# Patient Record
Sex: FEMALE | Race: WHITE
[De-identification: names, ages, dates, MRNs, and addresses within clinical notes are randomized per-mention and may not be internally consistent; named-entity substitution may affect disease eponyms.]

---

## 2018-08-30 ENCOUNTER — HOSPITAL ENCOUNTER (OUTPATIENT)
Dept: HOSPITAL 61 - KCIC MRI | Age: 53
Discharge: HOME | End: 2018-08-30
Attending: OBSTETRICS & GYNECOLOGY
Payer: COMMERCIAL

## 2018-08-30 DIAGNOSIS — N85.2: Primary | ICD-10-CM

## 2018-08-30 PROCEDURE — A9585 GADOBUTROL INJECTION: HCPCS

## 2018-08-30 PROCEDURE — 72197 MRI PELVIS W/O & W/DYE: CPT

## 2018-08-30 NOTE — [facility]
MRI pelvis with and without contrast dated 8/30/2018.

 

No comparison available.

 

Clinical data indication: Left lower quadrant pain. Fibroid uterus.

 

TECHNIQUE:

 

Routine multiplanar multisequence MR imaging performed with and without 

the administration of 10 cc Gadavist.

 

FINDINGS:

 

Uterus is enlarged, measuring 15.5 x 7.6 x 9.7 cm. There are multiple 

heterogeneous mass lesions throughout the myometrium, consistent with 

fibroids. Lesion at the right anterior aspect of the lower uterine segment

is predominantly hypointense to isointense on T1 and T2-weighted imaging 

and measures up to 4.7 cm. More heterogeneous lesion at the left aspect of

the lower uterine body measures up to 5.6 cm in size and contains some 

internal cystic components. Heterogeneous lesion at the posterior uterine 

body near the fundus is partially exophytic and measures 4.6 cm maximum 

dimension. Smaller nodules located at the fundus and anterior uterine body

measure up to 2.8 cm maximum dimension. There are several additional 

cystic foci throughout.

 

The endometrium is thickened and heterogeneous with prominent enhancement 

measuring up to 2.7 cm in width. There is a small nodular focus within the

endometrium at the lower uterine segment which may be a submucosal fibroid

or a pedunculated polyp, measuring about 7 mm maximum dimension. Multiple 

nabothian cysts at the endocervix.

 

Left ovary measures 5.1 x 5.2 x 6.6 cm. There are multiple cysts at the 

left ovary, some of which are complex. There is a central solid component 

measuring up to 2.2 cm. A simple cyst at the upper margin of the left 

ovary measures up to 4.3 cm in size.

 

The right ovary is not clearly identified. There is a cluster small cyst 

along the anterior right margin of the uterus that could represent a cyst 

filled ovary, measuring approximately 4.2 x 1.6 x 2.0 cm. No definite 

solid component or abnormal enhancement.

 

There is no significant free fluid. No pelvic adenopathy. Urinary bladder 

is collapsed and not well evaluated. No acute bony abnormality.

 

IMPRESSION:

1. Enlarged myomatous uterus as described above.

2. Thickened enhancing endometrial complex of uncertain etiology. This 

could be related to menstrual cycle or endometrial hyperplasia. If the 

patient is postmenopausal, malignancy cannot be excluded.

3. There is an additional nonenhancing nodular focus within the canal at 

the lower uterine segment which could represent a small submucosal fibroid

or polyp.

4. Complex cystic mass on the left with possible 2.2 cm central solid 

nodular component. Although this could represent hemorrhagic cyst or other

complex benign cyst, underlying ovarian malignancy cannot be excluded. 

Follow-up imaging is recommended to ensure stability.

 

Electronically signed by: Rick Watters MD (8/30/2018 1:24 PM) 

[facility]

## 2018-10-18 ENCOUNTER — HOSPITAL ENCOUNTER (OUTPATIENT)
Dept: HOSPITAL 61 - SURGPAT | Age: 53
Discharge: HOME | End: 2018-10-18
Attending: OBSTETRICS & GYNECOLOGY
Payer: COMMERCIAL

## 2018-10-18 DIAGNOSIS — Z01.818: Primary | ICD-10-CM

## 2018-10-18 DIAGNOSIS — E66.01: ICD-10-CM

## 2018-10-18 DIAGNOSIS — N85.2: ICD-10-CM

## 2018-10-18 LAB
ALBUMIN SERPL-MCNC: 3.3 G/DL (ref 3.4–5)
ALBUMIN/GLOB SERPL: 0.7 {RATIO} (ref 1–1.7)
ALP SERPL-CCNC: 104 U/L (ref 46–116)
ALT SERPL-CCNC: 83 U/L (ref 14–59)
ANION GAP SERPL CALC-SCNC: 5 MMOL/L (ref 6–14)
APTT PPP: YELLOW S
AST SERPL-CCNC: 63 U/L (ref 15–37)
BACTERIA #/AREA URNS HPF: 0 /HPF
BASOPHILS # BLD AUTO: 0.1 X10^3/UL (ref 0–0.2)
BASOPHILS NFR BLD: 1 % (ref 0–3)
BILIRUB SERPL-MCNC: 0.5 MG/DL (ref 0.2–1)
BILIRUB UR QL STRIP: NEGATIVE
BUN SERPL-MCNC: 12 MG/DL (ref 7–20)
BUN/CREAT SERPL: 17 (ref 6–20)
CALCIUM SERPL-MCNC: 8.5 MG/DL (ref 8.5–10.1)
CHLORIDE SERPL-SCNC: 103 MMOL/L (ref 98–107)
CO2 SERPL-SCNC: 29 MMOL/L (ref 21–32)
CREAT SERPL-MCNC: 0.7 MG/DL (ref 0.6–1)
EOSINOPHIL NFR BLD: 0.3 X10^3/UL (ref 0–0.7)
EOSINOPHIL NFR BLD: 3 % (ref 0–3)
ERYTHROCYTE [DISTWIDTH] IN BLOOD BY AUTOMATED COUNT: 15.5 % (ref 11.5–14.5)
FIBRINOGEN PPP-MCNC: CLEAR MG/DL
GFR SERPLBLD BASED ON 1.73 SQ M-ARVRAT: 87.9 ML/MIN
GLOBULIN SER-MCNC: 4.7 G/DL (ref 2.2–3.8)
GLUCOSE SERPL-MCNC: 101 MG/DL (ref 70–99)
HCT VFR BLD CALC: 37.9 % (ref 36–47)
HGB BLD-MCNC: 12.9 G/DL (ref 12–15.5)
LYMPHOCYTES # BLD: 1.7 X10^3/UL (ref 1–4.8)
LYMPHOCYTES NFR BLD AUTO: 19 % (ref 24–48)
MCH RBC QN AUTO: 29 PG (ref 25–35)
MCHC RBC AUTO-ENTMCNC: 34 G/DL (ref 31–37)
MCV RBC AUTO: 85 FL (ref 79–100)
MONO #: 0.6 X10^3/UL (ref 0–1.1)
MONOCYTES NFR BLD: 6 % (ref 0–9)
NEUT #: 6.4 X10^3UL (ref 1.8–7.7)
NEUTROPHILS NFR BLD AUTO: 71 % (ref 31–73)
NITRITE UR QL STRIP: NEGATIVE
PH UR STRIP: 5.5 [PH]
PLATELET # BLD AUTO: 274 X10^3/UL (ref 140–400)
POTASSIUM SERPL-SCNC: 3.5 MMOL/L (ref 3.5–5.1)
PROT SERPL-MCNC: 8 G/DL (ref 6.4–8.2)
PROT UR STRIP-MCNC: NEGATIVE MG/DL
RBC # BLD AUTO: 4.49 X10^6/UL (ref 3.5–5.4)
RBC #/AREA URNS HPF: >40 /HPF (ref 0–2)
SODIUM SERPL-SCNC: 137 MMOL/L (ref 136–145)
SQUAMOUS #/AREA URNS LPF: (no result) /LPF
UROBILINOGEN UR-MCNC: 0.2 MG/DL
WBC # BLD AUTO: 9 X10^3/UL (ref 4–11)
WBC #/AREA URNS HPF: (no result) /HPF (ref 0–4)

## 2018-10-18 PROCEDURE — 93005 ELECTROCARDIOGRAM TRACING: CPT

## 2018-10-18 PROCEDURE — 80053 COMPREHEN METABOLIC PANEL: CPT

## 2018-10-18 PROCEDURE — 36415 COLL VENOUS BLD VENIPUNCTURE: CPT

## 2018-10-18 PROCEDURE — 81001 URINALYSIS AUTO W/SCOPE: CPT

## 2018-10-18 PROCEDURE — 71046 X-RAY EXAM CHEST 2 VIEWS: CPT

## 2018-10-18 PROCEDURE — 85025 COMPLETE CBC W/AUTO DIFF WBC: CPT

## 2018-10-18 NOTE — RAD
EXAM: Chest, 2 views.

 

HISTORY: Preoperative evaluation. Pain.

 

COMPARISON: None.

 

FINDINGS: 2 views of the chest are obtained. There is no infiltrate, 

pleural effusion or pneumothorax. The heart is normal in size.

 

IMPRESSION: No acute pulmonary finding.

 

Electronically signed by: Viviane Singleton MD (10/18/2018 3:46 PM) [facility]

## 2018-10-18 NOTE — EKG
[facility]

              [address] [place] [address]

Test Date:    2018-10-18               Test Time:    14:26:08

Pat Name:     LISA TEJADA             Department:   

Patient ID:   [id number]           Room:          

Gender:       F                        Technician:   JOCE

:          1965               Requested By: SEBASTIÁN MUNOZ

Order Number: [id number]           Reading MD:   Dereck Noble MD

                                 Measurements

Intervals                              Axis          

Rate:         75                       P:            53

VT:           166                      QRS:          15

QRSD:         92                       T:            70

QT:           384                                    

QTc:          431                                    

                           Interpretive Statements

SINUS RHYTHM



Electronically Signed On 10- 13:43:31 CDT by Dereck Noble MD

## 2018-10-31 ENCOUNTER — HOSPITAL ENCOUNTER (INPATIENT)
Dept: HOSPITAL 61 - SURG | Age: 53
LOS: 2 days | Discharge: HOME | DRG: 743 | End: 2018-11-02
Attending: OBSTETRICS & GYNECOLOGY | Admitting: OBSTETRICS & GYNECOLOGY
Payer: COMMERCIAL

## 2018-10-31 VITALS — SYSTOLIC BLOOD PRESSURE: 91 MMHG | DIASTOLIC BLOOD PRESSURE: 55 MMHG

## 2018-10-31 VITALS — BODY MASS INDEX: 41.64 KG/M2 | HEIGHT: 63 IN | WEIGHT: 235 LBS

## 2018-10-31 VITALS — SYSTOLIC BLOOD PRESSURE: 126 MMHG | DIASTOLIC BLOOD PRESSURE: 67 MMHG

## 2018-10-31 VITALS — DIASTOLIC BLOOD PRESSURE: 59 MMHG | SYSTOLIC BLOOD PRESSURE: 97 MMHG

## 2018-10-31 VITALS — SYSTOLIC BLOOD PRESSURE: 97 MMHG | DIASTOLIC BLOOD PRESSURE: 55 MMHG

## 2018-10-31 VITALS — SYSTOLIC BLOOD PRESSURE: 96 MMHG | DIASTOLIC BLOOD PRESSURE: 51 MMHG

## 2018-10-31 VITALS — SYSTOLIC BLOOD PRESSURE: 99 MMHG | DIASTOLIC BLOOD PRESSURE: 52 MMHG

## 2018-10-31 DIAGNOSIS — N83.202: ICD-10-CM

## 2018-10-31 DIAGNOSIS — Z88.2: ICD-10-CM

## 2018-10-31 DIAGNOSIS — D25.9: Primary | ICD-10-CM

## 2018-10-31 DIAGNOSIS — R97.1: ICD-10-CM

## 2018-10-31 LAB
ERYTHROCYTE [DISTWIDTH] IN BLOOD BY AUTOMATED COUNT: 15.2 % (ref 11.5–14.5)
HCT VFR BLD CALC: 25.6 % (ref 36–47)
HGB BLD-MCNC: 8.8 G/DL (ref 12–15.5)
MCH RBC QN AUTO: 29 PG (ref 25–35)
MCHC RBC AUTO-ENTMCNC: 34 G/DL (ref 31–37)
MCV RBC AUTO: 85 FL (ref 79–100)
PLATELET # BLD AUTO: 321 X10^3/UL (ref 140–400)
RBC # BLD AUTO: 3.02 X10^6/UL (ref 3.5–5.4)
U PREG PATIENT: NEGATIVE
WBC # BLD AUTO: 15.9 X10^3/UL (ref 4–11)

## 2018-10-31 PROCEDURE — 0UT60ZZ RESECTION OF LEFT FALLOPIAN TUBE, OPEN APPROACH: ICD-10-PCS | Performed by: OBSTETRICS & GYNECOLOGY

## 2018-10-31 PROCEDURE — 85027 COMPLETE CBC AUTOMATED: CPT

## 2018-10-31 PROCEDURE — 81025 URINE PREGNANCY TEST: CPT

## 2018-10-31 PROCEDURE — 88112 CYTOPATH CELL ENHANCE TECH: CPT

## 2018-10-31 PROCEDURE — 86901 BLOOD TYPING SEROLOGIC RH(D): CPT

## 2018-10-31 PROCEDURE — 36415 COLL VENOUS BLD VENIPUNCTURE: CPT

## 2018-10-31 PROCEDURE — 3E1M38X IRRIGATION OF PERITONEAL CAVITY USING IRRIGATING SUBSTANCE, PERCUTANEOUS APPROACH, DIAGNOSTIC: ICD-10-PCS | Performed by: OBSTETRICS & GYNECOLOGY

## 2018-10-31 PROCEDURE — 86850 RBC ANTIBODY SCREEN: CPT

## 2018-10-31 PROCEDURE — 80048 BASIC METABOLIC PNL TOTAL CA: CPT

## 2018-10-31 PROCEDURE — 88305 TISSUE EXAM BY PATHOLOGIST: CPT

## 2018-10-31 PROCEDURE — P9045 ALBUMIN (HUMAN), 5%, 250 ML: HCPCS

## 2018-10-31 PROCEDURE — 0UT90ZZ RESECTION OF UTERUS, OPEN APPROACH: ICD-10-PCS | Performed by: OBSTETRICS & GYNECOLOGY

## 2018-10-31 PROCEDURE — 0UT10ZZ RESECTION OF LEFT OVARY, OPEN APPROACH: ICD-10-PCS | Performed by: OBSTETRICS & GYNECOLOGY

## 2018-10-31 PROCEDURE — A4461 SURGICL DRESS HOLD NON-REUSE: HCPCS

## 2018-10-31 PROCEDURE — A7015 AEROSOL MASK USED W NEBULIZE: HCPCS

## 2018-10-31 PROCEDURE — 85014 HEMATOCRIT: CPT

## 2018-10-31 PROCEDURE — 86900 BLOOD TYPING SEROLOGIC ABO: CPT

## 2018-10-31 PROCEDURE — 88307 TISSUE EXAM BY PATHOLOGIST: CPT

## 2018-10-31 RX ADMIN — FENTANYL CITRATE PRN MCG: 50 INJECTION INTRAMUSCULAR; INTRAVENOUS at 12:06

## 2018-10-31 RX ADMIN — SODIUM CHLORIDE, SODIUM LACTATE, POTASSIUM CHLORIDE, AND CALCIUM CHLORIDE SCH MLS/HR: .6; .31; .03; .02 INJECTION, SOLUTION INTRAVENOUS at 06:42

## 2018-10-31 RX ADMIN — SODIUM CHLORIDE, SODIUM LACTATE, POTASSIUM CHLORIDE, AND CALCIUM CHLORIDE SCH MLS/HR: .6; .31; .03; .02 INJECTION, SOLUTION INTRAVENOUS at 11:22

## 2018-10-31 RX ADMIN — FENTANYL CITRATE PRN MCG: 50 INJECTION INTRAMUSCULAR; INTRAVENOUS at 11:21

## 2018-10-31 RX ADMIN — SODIUM CHLORIDE, SODIUM LACTATE, POTASSIUM CHLORIDE, AND CALCIUM CHLORIDE SCH MLS/HR: .6; .31; .03; .02 INJECTION, SOLUTION INTRAVENOUS at 12:05

## 2018-10-31 NOTE — OP
DATE OF SURGERY:  10/31/2018



PREOPERATIVE DIAGNOSES:  Enlarged fibroid uterus, left ovarian mass with

elevated CA-125.



PROCEDURE:  Total abdominal hysterectomy, left salpingo-oophorectomy and pelvic

washings for cytology with a prior RSO.



SURGEON:  Sebastián Munoz MD.



ASSISTANT:  first linden Dougherty.



ANESTHESIOLOGIST:  Soy Bee MD.



ANESTHESIA:  General endotracheal.



ESTIMATED BLOOD LOSS:  1100 mL.



URINE OUTPUT:  200 mL, clear via the whole case until the last 15-20 minutes

where she got 30 mL of blood-tinged urine via the Calvert.



INTRAVENOUS FLUIDS:  1300 mL of LR and then 500 mL of albumin.



SPECIMEN REMOVED:  Cervix, uterus fibroid, tumors left tube and ovary.



FINDINGS:  Enlarged fibroid uterus.  No significant pelvic adhesive disease,

left ovarian cystic mass that was sent off for fresh pathology, but Dr. Sanchez came back and said appeared to be a benign process.



COMPLICATIONS:  None.



DESCRIPTION OF PROCEDURE:  This patient was taken to the operating room where

general anesthesia was placed.  The patient was placed in a dorsal supine

position.  A Calvert catheter had been inserted under sterile technique.  The

patient's abdomen was prepped and draped in the normal sterile fashion.  After a

timeout was performed, she had been given her IV antibiotics and the time

everyone approved the timeout, a transverse Pfannenstiel skin incision was made

with the scalpel.  Bovie cautery was used in the subcuticular layer to maintain

hemostasis.  The fascia was scored in the midline and extended sharply and

bluntly bilaterally with the Bovie cautery.  Kocher clamps x 2 were placed on

superior fascial edge.  The fascia was dissected from the rectus muscles beneath

sharply and bluntly.  This was done inferiorly as well.  The rectus muscles were

 digitally in the midline and the peritoneum was digitally and bluntly

entered and stretched.  The enlarged uterus and cystic left ovary could easily

be felt.  She was placed in a slight Trendelenburg, 3 moist laps were used to

pack away the bowel and an O'Austyn-O'Puga retractor was placed in the

abdomen with a large shallow upper blade and a bladder blade below.  Pean's x 2

were placed on the cornua of the uterus to elevate it.  It was very difficult as

this was a broad uterus and barely came through the thing.  The right side had

previously been taken, but I did get the round ligament on the right side. 

There was no tube and ovary on the right, but the #0 Vicryl was used to place 2

stitches in the right round ligament.  The Bovie cautery was used to cauterize

between these and go down towards the bladder flap anteriorly and then, this was

done exactly the same on the left getting the round ligament with 0 Vicryl x 2,

but using the Bovie cautery to go through this and go down towards the bladder

flap anteriorly.  At this point, there was a large fibroid anteriorly that was

scored with the cautery.  A towel clip was placed over it and it was taken off,

so I could see anteriorly and further take down that bladder flap anteriorly.  I

felt low and felt the ureter coursing low on the left side.  Again, the right

side, I did not even do anything on the right side as it was previously taken. 

So I did not even feel for the ureter on this side or do anything on the right

side as it was completely gone, but it felt to be low and the cystic mass with

high right next to the uterus, so a window through this area after the round

ligament was obtained.  My finger popped through.  Curved Cathi clamps x 2 were

placed behind the cystic mass, the curved Heaneys x 2 and it was cut off.  It

completely came off and it was passed off for fresh pathology.  An 0 Vicryl was

used to tie these.  The first one was just at the tip and around and then the

second one was like a Brian tree before and after going through the middle

wrapping it around and making sure the blood supply was adequately got.  Once

this was done, the remainder of the utero-ovarian pedicle was doubly clamped

freeing everything up with curved Heaneys, cut and this freed up the left side

and then, curved Heaneys were placed on the uterine vessels on this side x 2.  A

straight Allis was placed for backbleeding on the other side of it and it was

cut with Elkins scissors.  An 0 Vicryl x 2 were placed on the uterine vascular

pedicles.  This was done exactly the same on the right side, double clamping

with curved Cathi's, cutting with Elkins scissors, putting a straight Allis on

for backbleeding, cutting and then suture ligating x 2 with 0 Vicryl.  Then

staying inside that pedicle, straight Heaneys were used on both sides going down

through the cardinal and broad ligaments.  There were more fibroids that were

taken out and at one point, now that we were down to the cervix and I could see

cervix, I did take the cautery and amputate the uterus and place our long

Kocher's on the cervical stub.  Once this was done, I was able to take a sponge

stick and make sure the bladder was down anteriorly and it was and even at this

point, the urine was clear, everything looked good, the bladder was down.  So 2

more bites with the straights and then a curved got us in vaginally and the

Otf's were used to amputate the remaining cervix.  The long Kocher's were

placed on the anterior and posterior vaginal cuff and a series of interrupted 0

Vicryls were used to close the cuff.  I started at the corners on both coming in

and taking it through the pedicles where the uterosacrals were on both sides

tagging the corners and then placing I believe 4 interrupted 0 Vicryl sutures on

the cuff as well.  Once this was done, copious irrigation revealed hemostasis. 

The round and left pedicles were dry.  Nuzhat was placed over the vaginal cuff

and the tags were clipped.  Nothing weld up, everything stayed dry.  All 3 moist

laps were removed from the abdomen where the packing had been done.  All sponge,

lap and needle counts were correct x 2 by OR personnel.  The rectus muscles were

dry, so at this point, I did look again and the cuff remained dry and the Nuzhat

remained white and powdery.  So, the fascia was closed with 0 Vicryl from left

to past midline and then right embedded and tied together.  Copious irrigation

in the subcuticular layer revealed hemostasis as well.  A 3-0 Vicryl was used to

close off the Giulia's subcuticular layer and 3-0 Monocryl was used to close the

skin.  Mastisol and Steri-Strips were placed over the skin as well.  The patient

tolerated the procedure well and is currently being awakened from anesthesia.

 



______________________________

SEBASTIÁN MUNOZ MD



DR:  SEBASTIAN/chuyita  JOB#:  [id number] / [id number]

DD:  10/31/2018 10:59  DT:  10/31/2018 12:27

## 2018-11-01 VITALS — DIASTOLIC BLOOD PRESSURE: 67 MMHG | SYSTOLIC BLOOD PRESSURE: 125 MMHG

## 2018-11-01 VITALS — DIASTOLIC BLOOD PRESSURE: 70 MMHG | SYSTOLIC BLOOD PRESSURE: 128 MMHG

## 2018-11-01 VITALS — SYSTOLIC BLOOD PRESSURE: 119 MMHG | DIASTOLIC BLOOD PRESSURE: 59 MMHG

## 2018-11-01 VITALS — DIASTOLIC BLOOD PRESSURE: 69 MMHG | SYSTOLIC BLOOD PRESSURE: 131 MMHG

## 2018-11-01 LAB
ANION GAP SERPL CALC-SCNC: 6 MMOL/L (ref 6–14)
BUN SERPL-MCNC: 8 MG/DL (ref 7–20)
CALCIUM SERPL-MCNC: 7.9 MG/DL (ref 8.5–10.1)
CHLORIDE SERPL-SCNC: 109 MMOL/L (ref 98–107)
CO2 SERPL-SCNC: 28 MMOL/L (ref 21–32)
CREAT SERPL-MCNC: 0.7 MG/DL (ref 0.6–1)
GFR SERPLBLD BASED ON 1.73 SQ M-ARVRAT: 87.9 ML/MIN
GLUCOSE SERPL-MCNC: 124 MG/DL (ref 70–99)
POTASSIUM SERPL-SCNC: 4.4 MMOL/L (ref 3.5–5.1)
SODIUM SERPL-SCNC: 143 MMOL/L (ref 136–145)

## 2018-11-01 RX ADMIN — HYDROCODONE BITARTRATE AND ACETAMINOPHEN PRN TAB: 5; 325 TABLET ORAL at 05:26

## 2018-11-01 RX ADMIN — OXYCODONE HYDROCHLORIDE AND ACETAMINOPHEN PRN TAB: 5; 325 TABLET ORAL at 09:53

## 2018-11-01 RX ADMIN — HYDROCODONE BITARTRATE AND ACETAMINOPHEN PRN TAB: 5; 325 TABLET ORAL at 00:17

## 2018-11-01 RX ADMIN — Medication SCH MG: at 16:28

## 2018-11-01 RX ADMIN — OXYCODONE HYDROCHLORIDE AND ACETAMINOPHEN PRN TAB: 5; 325 TABLET ORAL at 16:29

## 2018-11-01 RX ADMIN — Medication SCH MG: at 12:28

## 2018-11-01 NOTE — PATHOLOGY
Note

[facility] Accession Number: [id number]

   TESTS               RESULT  FLAG  UNITS    REF RANGE  LAB

------------------------------------------------------------

   Clinician Provided Cytology Information

   No. of containers..01 Other (Miscellaneous)

Source:                                                   01

   PELVIC WASH

DIAGNOSIS:                                                02

   PELVIC WASH

   NEGATIVE FOR MALIGNANT CELLS.

   SCANT CELLULARITY.

   THIS INTERPRETATION INCLUDES EVALUATION OF A CELL BLOCK.

Signed out by:                                            02

   Bunny Gee MD, Pathologist

   NPI- [id number]

Performed by:                                             01

   Kilo Chowdhury, Cytotechnologist ([facility])

Gross description:                                        01

   18ML, RED, SOLID

   /LCS



------------------------------------------------------------

    FLAG LEGEND:

    L-Low Normal,H-High Normal,LL-Alert Low,HH-Alert High

    <-Panic Low,>-Panic High,A-Abnormal,AA-Critical Abnormal

------------------------------------------------------------



Performed at:

[address]

   [place]  [address]

   Jony Lennon MD, Phone: [phone]

02 [facility]

   [address] [place]  [address]

   Chon Sanchez MD, Phone: [phone]

Specimen Comment: A courtesy copy of this report has been sent to

Specimen Comment: [phone].

Specimen Comment: Report sent to 

Specimen Comment: A duplicate report has been generated due to demographic updates.

***Performed at:  [address] [place] [address] [address], [place], [place]  [id number]

   MD Jony Lennon MD Phone:  [phone]

## 2018-11-02 VITALS
SYSTOLIC BLOOD PRESSURE: 118 MMHG | SYSTOLIC BLOOD PRESSURE: 118 MMHG | DIASTOLIC BLOOD PRESSURE: 70 MMHG | DIASTOLIC BLOOD PRESSURE: 70 MMHG | DIASTOLIC BLOOD PRESSURE: 70 MMHG | DIASTOLIC BLOOD PRESSURE: 70 MMHG | SYSTOLIC BLOOD PRESSURE: 118 MMHG | SYSTOLIC BLOOD PRESSURE: 118 MMHG

## 2018-11-02 VITALS — DIASTOLIC BLOOD PRESSURE: 61 MMHG | SYSTOLIC BLOOD PRESSURE: 110 MMHG

## 2018-11-02 RX ADMIN — Medication SCH MG: at 12:32

## 2018-11-02 RX ADMIN — OXYCODONE HYDROCHLORIDE AND ACETAMINOPHEN PRN TAB: 5; 325 TABLET ORAL at 00:07

## 2018-11-02 RX ADMIN — HYDROCODONE BITARTRATE AND ACETAMINOPHEN PRN TAB: 5; 325 TABLET ORAL at 12:32

## 2018-11-02 RX ADMIN — HYDROCODONE BITARTRATE AND ACETAMINOPHEN PRN TAB: 5; 325 TABLET ORAL at 08:51

## 2018-11-02 NOTE — PDOC3
Discharge Summary


Visit Information


Date of Admission:  Oct 31, 2018


Date of Discharge:  Nov 2, 2018


Final Diagnosis


enlarged fibroid uterus, left ovarian mass





Brief Hospital Course


Allergies





 Allergies








Coded Allergies Type Severity Reaction Last Updated Verified


 


  Sulfa (Sulfonamide Antibiotics) Allergy Intermediate  10/31/18 Yes








Vital Signs





Vital Signs








  Date Time  Temp Pulse Resp B/P (MAP) Pulse Ox O2 Delivery O2 Flow Rate FiO2


 


11/2/18 03:50 98.4 75 15 110/61 (77) 97 Room Air  





 98.4       








Lab Results





Laboratory Tests








Test


 10/31/18


11:25 11/1/18


05:10


 


White Blood Count


 15.9 x10^3/uL


(4.0-11.0) 





 


Red Blood Count


 3.02 x10^6/uL


(3.50-5.40) 





 


Hemoglobin


 8.8 g/dL


(12.0-15.5) 





 


Hematocrit


 25.6 %


(36.0-47.0) 23.3 %


(36.0-47.0)


 


Mean Corpuscular Volume 85 fL ()  


 


Mean Corpuscular Hemoglobin 29 pg (25-35)  


 


Mean Corpuscular Hemoglobin


Concent 34 g/dL


(31-37) 





 


Red Cell Distribution Width


 15.2 %


(11.5-14.5) 





 


Platelet Count


 321 x10^3/uL


(140-400) 





 


Sodium Level


 


 143 mmol/L


(136-145)


 


Potassium Level


 


 4.4 mmol/L


(3.5-5.1)


 


Chloride Level


 


 109 mmol/L


()


 


Carbon Dioxide Level


 


 28 mmol/L


(21-32)


 


Anion Gap  6 (6-14) 


 


Blood Urea Nitrogen  8 mg/dL (7-20) 


 


Creatinine


 


 0.7 mg/dL


(0.6-1.0)


 


Estimated GFR


(Cockcroft-Gault) 


 87.9 





 


Glucose Level


 


 124 mg/dL


(70-99)


 


Calcium Level


 


 7.9 mg/dL


(8.5-10.1)








Brief Hospital Course


Ms. Santos  is a 53 old female who presented with enlarged fibroid uterus with 

menorrhagia, during the work up an enlarged left ovary with mass was discovered 

also with elevated ca-125.  She underwent a BRENNEN/LSO/pelvic washings (prior RSO) 

on 10-31-18.  Initial fresh of the left ovary appeared to be a benign process.  

She has had an unremarkable posteropative course.  She is doing well tolerating 

regular diet, voiding, ambulating and desiring to go home.  Pain controlled 

with Lortabs here fine.





Discharge Information


Condition at Discharge:  Improved


Follow Up:  Weeks


Disposition/Orders:  D/C to Home


Scheduled


Fexofenadine Hcl (Allegra Allergy) 180 Mg Tablet, 180 MG PO DAILY, (Reported)


   Entered as Reported by: Kadi Robert on 8/30/18 1243


   Last Taken: Unknown Dose on 10/29/18      Last Action: Last Taken Edited on 

10/31/18 0642 by YOVANNY STONE


Paroxetine Hcl (Paxil) 20 Mg Tablet, 1 TAB PO HS, #30 Ref 5 (Reported)


   Entered as Reported by: EH GUADARRAMA on 10/18/18 1445


   Last Taken: Unknown Dose on 10/29/18      Last Action: Last Taken Edited on 

10/31/18 0642 by YOVANNY STONE





Scheduled PRN


Ibuprofen (Ibuprofen) 400 Mg Tablet, 400 MG PO PRN Q6HRS PRN for INFLAMMATION, (

Reported)


   Entered as Reported by: Kadi Robert on 8/30/18 1243


   Last Taken: Unknown Dose on 10/23/18      Last Action: Last Taken Edited on 

10/31/18 0642 by YOVANNY STONE





Patient Instructions


Patient Instructions


POD#2 s/p BRENNEN/LSO/pelvic washings


Routine pO care


d/c to home


NPV x 6 weeks


light/limited activity x 2 weeks


NO driving on pain meds


keep scheduled follow up with me in 7-10d 


call or return sooner for any other questions or concerns not limited to but 

including pain unrelieved with pain meds, increased or unexplained vaginal 

bleeding or T>100.4


already has pain meds at home


BID iron with meals











SEBASTIÁN MUNOZ MD Nov 2, 2018 08:27

## 2018-11-04 NOTE — PATHOLOGY
[facility] Accession Number: [id number]

.                                                                01

Material submitted:                                        .

PART A: LEFT FALLOPIAN TUBE AND OVARY FRESH - FS

PART B: UTERUS, CERVIX AND FIBROIDS

.                                                                01

Clinical history:                                          .

Vaginal bleeding

.                                                                02

**********************************************************************

Diagnosis:

A. Ovary and fallopian tube, left, salpingo-oophorectomy:

- Ovary with multiloculated serous cystadenofibroma (8 x 5.5 x 3.0 cm).

- Right fallopian tube with simple paratubal cyst.

- Separate portion of benign smooth muscle consistent with portion of

leiomyoma, 1.5 cm.

.

B. "Uterus, cervix, and fibroids", hysterectomy:

- Cervix with mild chronic inflammation, squamous metaplasia, nabothian

cysts, and endocervical polyp (1.7 cm) with moderate chronic inflammation

and focal squamous metaplasia.

- Proliferative phase endometrium.

- Myometrium with extensive adenomyosis and multiple leiomyomata, up to

5.8 cm.

- Serosal surface with focal endosalpingiosis.

.

(SKM:vjm;11/02/2018)

AGA/11/04/2018

**********************************************************************

.                                                                02

Electronically signed:                                     .

Bunny Gee MD, Pathologist

NPI- [id number]

.                                                                01

Gross description:                                         .

A.  The specimen is received fresh for intraoperative consultation and is

designated "left fallopian tube and ovary".  This consists of an enlarged

nodular multicystic ovary with attached fimbriated segment of fallopian

tube.  The fallopian tube measures approximately 4.5 cm in length.  The

serosa is pinkish red and smooth.  The attached ovary has a nodular

multicystic appearance and measures up to 8.0 x 5.5 x 3.0 cm in greatest

dimension.  The capsular surface is pink to pale gray and generally

appears smooth.  There are focal reddened friable granular areas noted on

the external surface.  Sectioning reveals multiple cysts ranging from 0.2

cm up to 2.5 cm in greatest dimension.  The cysts contain a yellow, watery

serous fluid.  The cysts do not reveal any obvious papillary projections.

The central portion of the ovary is yellow-tan, solid and rubbery.  Also

received within the specimen container is a well-circumscribed tan,

rubbery nodule measuring 1.5 cm in greatest dimension.  This has a

tan-white whorled rubbery cut surface consistent with a leiomyoma.  A

representative portion of the ovary is submitted for frozen section as

FSA1.  The tissue remaining for frozen section is submitted for

permanent sections as A1.

(JPM:delaney; 10/31/2018)

.

Additional representative sections are submitted as follows:

.

A2      representative sections of fallopian tube and separately submitted

possible leiomyoma

A3-A10  representative sections of ovary.

.

B.  The specimen is received in formalin, labeled "Aleena Santos, uterus,

cervix, fibroids".  Received is a 312 g, 10.2 x 8.1 x 7.5 cm uterine

corpus and separately submitted port 5 g cervical stump.  The uterine

serosa is light tan and disrupted in appearance with multiple cystic

structures ranging in size from 0.3 to 0.6 cm filled with clear fluid.

The uterine corpus cannot be oriented.  The uterine corpus is opened

laterally to reveal a triangular endometrial cavity measuring 7.0 cm in

length by 4.4 cm in width.  The endometrium is pink-tan, glistening to

shaggy in appearance and measures 0.1 cm in thickness.  Serial sectioning

reveals a tan-pink, trabeculated myometrium measuring up to 3.5 cm in

thickness displaying evidence of moderate adenomyosis, as well as several

intramural fibroids ranging in size from 1.2 to 2.1 cm in maximum

dimensions.

.

The cervical stump measures 5.8 x 3.6 x 3.5 cm in greatest dimensions.

The 0.8 cm cervical os is surrounded by pale tan, smooth ectocervical

mucosa.  The cervical stump cannot be oriented and one half of the

paracervical margin is inked black.  The specimen is opened laterally to

reveal a pale tan endocervical canal measuring 4.5 cm in length displaying

a pale tan papillary-appearing lesion measuring 1.7 x 1.3 x 0.5 cm, which

is 1.0 cm from the cervical os and 2.0 cm from the superior aspect of the

lower uterine segment (please see attached photos).  The opposing

paracervical aspect is inked blue.  Sectioning through the lesion reveals

no gross invasion into the underlying tissue.

.

Also received within the specimen container are three fibroids ranging in

size from 4.5 x 3.3 x 3.2 to 5.8 x 4.7 x 3.5 cm in greatest dimensions and

weighing 149 g.  Sectioning reveals white-tan to pink-tan, whorled cut

surfaces with a slight amount of possible necrosis present.  The specimen

is submitted representatively as follows:

.

B1     cervix

B2     opposite cervix

B3-B6  entire papillary-appearing lesion within endocervical canal,

submitted from inferior to superior aspects

B7     serosal cystic structures

B8     endomyometrium

B9     opposite endomyometrium with adenomyosis

B10    intramural fibroids

B11-B12  representative sections of separately submitted fibroids with

possible necrosis submitted in cassette B11.

.

See attached gross photographs for explanation of how lesion and

endocervical canal was submitted.

(CAA; 11/1/2018)

.

INTRAOPERATIVE CONSULTATION WITH FROZEN SECTION

(Chon Sanchez)

.

Left fallopian tube and ovary, excision:

- Benign serous tumor with focal surface papillary serous adenofibroma.

.

The results were reported to Dr. Angelo in the operating room.

.

Additional sections are submitted following formal fixation.

.

Frozen section performed at [facility], [address] [place] [id number].

.

(JPM:delaney; 10/31/2018)

[facility]

.                                                                02

Pathologist provided ICD-10:

D27.1, D25.9, N72, N88.8, N84.1, N80.0

.                                                                02

CPT                                                        .

092609, 904241

Specimen Comment: A courtesy copy of this report has been sent to

Specimen Comment: [phone].

Specimen Comment: Report sent to 

Specimen Comment: A duplicate report has been generated due to demographic updates.

***Performed at:  01

   [facility]

   [address], [place]  [id number]

   MD Jony Lennon MD Phone:  [phone]

***Performed at:  02

   [address]  [id number]

   MD Chon Sanchez MD Phone:  [phone]

## 2018-12-11 ENCOUNTER — HOSPITAL ENCOUNTER (OUTPATIENT)
Dept: HOSPITAL 63 - MAMMO | Age: 53
Discharge: HOME | End: 2018-12-11
Payer: COMMERCIAL

## 2018-12-11 DIAGNOSIS — Z12.31: Primary | ICD-10-CM

## 2018-12-11 PROCEDURE — 77067 SCR MAMMO BI INCL CAD: CPT

## 2018-12-11 PROCEDURE — 77063 BREAST TOMOSYNTHESIS BI: CPT

## 2018-12-12 NOTE — RAD
DATE: December 11, 2018



EXAM: MAMMO HARRISON SCREENING BILATERAL



HISTORY: Screening study.



COMPARISON: None.  Baseline study.



This study was interpreted with the benefit of Computerized Aided Detection

(CAD).



2-D digital mammographic views of both breasts were performed in the CC and

MLO projections. 3-D digital tomosynthesis images of both breasts were

performed in the CC and MLO projections and reviewed on a computer

workstation.



FINDINGS:



Breast Density: SCATTERED The breast parenchyma shows scattered fibroglandular

densities. Breast parenchyma level B..  Bilateral nodularity is evident which

is a benign finding.  However, there is a prominent retroareolar nodule of the

left breast measuring 1.8 cm in size.  No clustering of pleomorphic

microcalcifications are evident on either side.





IMPRESSION: Prominent retroareolar nodule of the left breast.  Recommend left

breast sonography for further evaluation.







BI-RADS CATEGORY: 0 INCOMPLETE: NEEDS ADDITIONAL IMAGING EVALUATION AND/OR

PRIOR MAMMOGRAMS FOR COMPARISON.



RECOMMENDED FOLLOW-UP: ADD ADDITIONAL IMAGING



PQRS compliance statement: Patient information was entered into a reminder

system with a target due date now for the next imaging study.



Mammography is a sensitive method for finding small breast cancers, but it

does not detect them all and is not a substitute for careful clinical

examination.  A negative mammogram does not negate a clinically suspicious

finding and should not result in delay in biopsying a clinically suspicious

abnormality.



"Our facility is accredited by the American College of [employer] Mammography

Program."



The patient's breast density may affect the ability of mammography to detect

breast cancer. There are 4 categories of breast density, A, B, C and D. Breast

density A means that most of the breast tissue is replaced with adipose tissue

and therefore is not dense. Breast density B means that the breast tissue is

mildly dense and scattered. Breast density C means that the breast tissue is

heterogeneously dense. Breast density D means that the breast tissue is very

dense. Breast densities especially C and D may decrease the sensitivity of

mammography to detect breast cancer. Therefore, the patient may benefit from

3-D breast mammography (3D breast tomography) as a part of their screening

mammogram. Insurance may or may not pay for this additional imaging. The

patient's breast density based on today's mammogram is category B.

## 2018-12-17 ENCOUNTER — HOSPITAL ENCOUNTER (OUTPATIENT)
Dept: HOSPITAL 63 - US | Age: 53
Discharge: HOME | End: 2018-12-17
Payer: COMMERCIAL

## 2018-12-17 DIAGNOSIS — R92.8: Primary | ICD-10-CM

## 2018-12-17 PROCEDURE — 76641 ULTRASOUND BREAST COMPLETE: CPT

## 2018-12-17 NOTE — RAD
CLINICAL INDICATION: LISA TEJADA, who is 53 years of age, presents for 

further evaluation of a mammographic abnormality in the left breast

 

COMPARISON: Prior mammographic imaging dating back to 12/11/2018

 

TECHNIQUE: Grayscale and color Doppler sonographic evaluation of the left 

breast was performed in the area of previously seen mammographic 

abnormality.

 

ULTRASOUND FINDINGS: 

Targeted ultrasound of the retroareolar left breast.

 

Retroareolar position: A near anechoic mass of circumscribed margins and 

dependent low level echoes, likely debris is present with parallel 

orientation and is of oval/round shape. There is no internal vascularity 

on Doppler interrogation. It demonstrates posterior acoustic enhancement 

and measures 0.9 x 1 x 0.8 cm.

 

IMPRESSION:

1. Left breast mammographic abnormality correlates with a mildly complex 

cyst.

 

RECOMMENDATION:

In the absence of new clinical symptoms or change in physical exam, annual

screening mammography is recommended

 

BIRADS 2: BENIGN 

 

Electronically signed by: Ken Keenan MD (12/17/2018 5:24 PM) [facility] [place]

## 2020-03-19 ENCOUNTER — HOSPITAL ENCOUNTER (EMERGENCY)
Dept: HOSPITAL 63 - ER | Age: 55
LOS: 1 days | Discharge: HOME | End: 2020-03-20
Payer: COMMERCIAL

## 2020-03-19 VITALS — SYSTOLIC BLOOD PRESSURE: 141 MMHG | DIASTOLIC BLOOD PRESSURE: 74 MMHG

## 2020-03-19 VITALS — WEIGHT: 202.83 LBS | BODY MASS INDEX: 31.83 KG/M2 | HEIGHT: 67 IN

## 2020-03-19 DIAGNOSIS — G54.2: ICD-10-CM

## 2020-03-19 DIAGNOSIS — G89.29: ICD-10-CM

## 2020-03-19 DIAGNOSIS — M13.88: ICD-10-CM

## 2020-03-19 DIAGNOSIS — G43.809: Primary | ICD-10-CM

## 2020-03-19 PROCEDURE — 96372 THER/PROPH/DIAG INJ SC/IM: CPT

## 2020-03-19 PROCEDURE — 99284 EMERGENCY DEPT VISIT MOD MDM: CPT

## 2020-03-19 NOTE — PHYS DOC
Past History


Past Medical History:  Anxiety, Arthritis, Migraines





Adult General


Chief Complaint


Chief Complaint:  Neck Pain.. " I got bad neck problems and migraines... I have 

been seeing Dr. Apodaca.... And I had a CT... But nothing is working very 

welll... and I ve got a really bad  neck spasms and pain tonight... Having 

trouble getting to sleep..".. " I am just tired of puttng up with it..."





[facility]


HPI





Patient is a 54 year old female who presents with above hx and complaints of 

chronic neck pain and headaches.  Patient relates pain starts in her neck and 

comes up over the back of head to her forehead area. Patient currently  

following with , for her Cervicalgia and Intractable Migraines.  

Patient has had CT of cervical spine in September. Reportedly this showed 

arthritic changes. Patient denies any recent neck trauma. Patient denies any 

recent fever or chills. Patient denies any history of cancer. Patient denies any

problems with defecation or urination.  Patient does have history of 

malpositioned in her work environment at [employer]. 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   Patient has been following with 

chiropractor and has had an adjustment approximately 2 weeks ..  . 

has started her on several meds for her complaints and plans PT, eventual MRI, 

cervical epidural injections and if needed a neurosurgical consult.  Patient 

does have a follow-up appointment in approximately 3 weeks with Dr. Apodaca. 

Pt. follows with Dr. Ethel Adler.





Review of Systems


Review of Systems





Constitutional: Denies fever or chills []


Eyes: Denies change in visual acuity, redness, or eye pain []


HENT: Denies nasal congestion or sore throat []


Respiratory: Denies cough or shortness of breath []


Cardiovascular: No additional information not addressed in HPI []


GI: Denies abdominal pain, nausea, vomiting, bloody stools or diarrhea []


: Denies dysuria or hematuria []


Musculoskeletal: Denies back pain or joint pain []


Integument: Denies rash or skin lesions []


Neurologic: Complaints of intractable headache and neck pain,, denies focal 

weakness or gross sensory changes []


Endocrine: Denies polyuria or polydipsia []





All other systems were reviewed and found to be within normal limits, except as 

documented in this note.





Family History


Family History


Noncontributory to presentation





Current Medications


Current Medications


See nursing for home meds





Allergies


Allergies





Allergies








Coded Allergies Type Severity Reaction Last Updated Verified


 


  No Known Drug Allergies    3/19/20 No











Physical Exam


Physical Exam





Constitutional: , no acute distress, non-toxic appearance. []


HENT: Normocephalic, atraumatic, bilateral external ears normal, oropharynx 

moist, no oral exudates, nose normal. buck teeth. 


Eyes: PERRLA, EOMI, conjunctiva normal, no discharge. 


Neck:  Limitsl range of motion,Cervical muscle spasm and tenderness, supple, no 

stridor. [] No midline tenderness on percussion of cervical bodies.  Does seem 

to localized her pain in insertion of cervical muscle at base of skull. 


Cardiovascular:Heart rate regular rhythm, no murmur []


Lungs & Thorax:  Bilateral breath sounds clear to auscultation []


Abdomen: Bowel sounds normal, soft, no tenderness, no masses, no pulsatile 

masses. [] 


Skin: Warm, dry, no erythema, no rash. [] 


Back: No tenderness, no CVA tenderness. [] 


Extremities: No tenderness, no cyanosis, no clubbing, ROM intact, no edema. [] 


Neurologic: Alert and oriented X 3, is extremities on request, has distal 

sensory function, no focal deficits noted. []DTRs +2 patella and brachial. 

 equal. Right-hand dominant.


Psychologic: Affect anxious, judgement normal, mood normal. []





EKG


EKG


[]





Radiology/Procedures


Radiology/Procedures


[]





Course & Med Decision Making


Course & Med Decision Making


Pertinent Labs and Imaging studies reviewed. (See chart for details)





Will use only a temporizing pain meds tonight.- Toradol,  Depo-Medrol,  morphine

 subcutaneous, and Norflex.





Patient use ice packs as needed. Take meds as previous directed by Dr. Apodaca.  Practice good work positioning- in relation to neck movements and 

head positioning to the computer screen.  Use only a soft roll to support back 

of neck while sleeping.  Would recommend patient stop chiropractor adjustments. 

 Recommended use ice packs  on the back of neck.  Call Dr. Apodaca for an 

earlier appointment.  Severe pain may take Percocet up to 4 times a day.  Warned

 patient this may result in drug tolerance or addiction ..  Return if any 

concerns. 





Impression:





1. History of intractable chronic migraines and neck pain


2. Cervical neuropathy


3. Cervical arthritis- by hx. CT





Dragon Disclaimer


Dragon Disclaimer


This electronic medical record was generated, in whole or in part, using a voice

 recognition dictation system.





Departure


Departure:


Disposition:  01 HOME/RESIDENCE PRIOR TO ADM


Condition:  STABLE


Referrals:  


ETHEL ADLER DO (PCP)


Scripts


Oxycodone Hcl/Acetaminophen (PERCOCET 5-325 MG TABLET  **) 1 Each Tablet


1 TAB PO PRN QID PRN for PAIN MDD 4 Tablet(s) for 30 Days, #30 TAB 0 Refills


   Prov: VIJAYA OROZCO MD         3/20/20 


Hydrocodone/Ibuprofen (HYDROCODONE-IBUPROFEN 7.5-200  **) 1 Each Tablet


1 TAB PO PRN Q6HRS PRN for PAIN, #30 TAB 0 Refills


   Prov: VIJAYA OROZCO MD         3/20/20





Dragon Disclaimer


This chart was dictated in whole or in part using Voice Recognition software in 

a busy, high-work load, and often noisy Emergency Department environment.  It 

may contain unintended and wholly unrecognized errors or omissions.





Dragon Disclaimer


This chart was dictated in whole or in part using Voice Recognition software in 

a busy, high-work load, and often noisy Emergency Department environment.  It 

may contain unintended and wholly unrecognized errors or omissions.





Dragon Disclaimer


This chart was dictated in whole or in part using Voice Recognition software in 

a busy, high-work load, and often noisy Emergency Department environment.  It 

may contain unintended and wholly unrecognized errors or omissions.











VIJAYA OROZCO MD           Mar 19, 2020 23:19

## 2020-03-20 RX ADMIN — MORPHINE SULFATE ONE MG: 10 INJECTION, SOLUTION INTRAMUSCULAR; INTRAVENOUS at 01:00

## 2020-03-20 RX ADMIN — MORPHINE SULFATE ONE MG: 10 INJECTION, SOLUTION INTRAMUSCULAR; INTRAVENOUS at 01:09

## 2020-04-06 ENCOUNTER — HOSPITAL ENCOUNTER (OUTPATIENT)
Dept: HOSPITAL 61 - PNCL | Age: 55
End: 2020-04-06
Attending: ANESTHESIOLOGY
Payer: COMMERCIAL

## 2020-04-06 DIAGNOSIS — I10: ICD-10-CM

## 2020-04-06 DIAGNOSIS — Z88.1: ICD-10-CM

## 2020-04-06 DIAGNOSIS — Z90.721: ICD-10-CM

## 2020-04-06 DIAGNOSIS — Z90.710: ICD-10-CM

## 2020-04-06 DIAGNOSIS — Z87.39: ICD-10-CM

## 2020-04-06 DIAGNOSIS — Z90.49: ICD-10-CM

## 2020-04-06 DIAGNOSIS — M50.123: Primary | ICD-10-CM

## 2020-04-06 PROCEDURE — 62321 NJX INTERLAMINAR CRV/THRC: CPT

## 2020-04-06 NOTE — PAIN
DATE OF SERVICE:  04/06/2020



INITIAL CONSULTATION FOR PAIN CLINIC



CHIEF COMPLAINT:  Headache and neck pain.



HISTORY OF PRESENT ILLNESS:  This is a 54-year-old female who presents with

history of pain, headaches, migraines, and cervicalgia for about 9 months.  The

patient reports no specific injury or accident that she is aware of.  It is

getting gradually worse over time.  Pain in the base of neck and shoulders

causing headaches and becoming more constant, throbbing, shooting into the

bilateral shoulders, but only rarely into the upper extremities.  The patient

reports it is mainly causing pain in the neck and causing headaches to be worse.

 The patient has had migraines for some period of time.  She was seen by her

neurologist without any long-term success with medication management at this

time.  The patient reports the pain awakens her from sleep off and on, but not

every night.  The patient reports it does affect her bowel and bladder control,

but no incontinence.  Reports it does not affect her ability to walk.  She has

had physical therapy on her neck and shoulders, which was not significantly

helpful, this was in the last year by her report.  The patient describes the

pain as throbbing and shooting across the base of the shoulders into the back of

the neck and radiating upwards into the back of the head causing significant

headaches and worsening of headache.  The patient rates her disability rating

from 0-10, 10 being the worst, is an 8 with family and home responsibilities,

recreation, social activity; 9 with occupation and sexual behavior, 8 with

self-care and 7 with life support activities.  The patient did have some plain

films dated 08/2019 showing degenerative changes throughout the cervical spine

as noted.  The patient reports she had an MRI scan ordered, she believes, but

has not been performed yet and we could find no record of that as well.  The

patient reports no loss of motor function.  No weakness to the upper

extremities, she has not been dropping items.  She reports the pain is worse

with rotational motion of cervical spine, especially extension, but not with

forward flexion and with time using her computer, answering the phone when she

is working, there is exacerbation of the pain significantly with these

movements.



PAST MEDICAL HISTORY:  Significant for  hypertension, dizziness, headaches,

arthritis.



PREVIOUS SURGERY:  Include hysterectomy, cholecystectomy and oophorectomy.



CURRENT MEDICATIONS:  Include Paxil, Allegra, topiramate, amlodipine, meloxicam,

lisinopril, acetaminophen, cyclobenzaprine and tramadol.



ALLERGIES:  THE PATIENT IS ALLERGIC TO SULFA.



FAMILY HISTORY:  Significant for diabetes and hypertension.



SOCIAL HISTORY:  The patient reports she does not drink alcohol, does not smoke,

does not use any illegal, illicit or recreational drugs.  She is , lives

with her spouse in [place], [place], and works for the [employer],

using a phone with computer use and multiple rotational motions of her neck and

spine throughout her working day.



REVIEW OF SYSTEMS:  The patient's review of systems is positive for those items

mentioned in history of present illness.  All systems reviewed and otherwise

negative.  It is complete, full and well documented on the patient's chart.



PHYSICAL EXAMINATION:

VITAL SIGNS:  The patient's blood pressure is 190/88, pulse is 71, respirations

18, temperature is 97.3 degrees Fahrenheit, height is 5 feet 3 inches, weight is

193 pounds.

GENERAL:  The patient is awake, alert, oriented, appropriate, very pleasant

demeanor.

HEENT:  Exam shows normocephalic, atraumatic.  Extraocular movements are intact

and symmetrical.  Oral cavity shows mucous membranes moist and pink.  Dentition

is intact.

NECK:  Shows anterior throat supple without palpable lymphadenopathy noted. 

Swallow reflex symmetrical.

CHEST:  Shows normal on inspection.  Breath sounds are clear bilaterally.

HEART:  Shows S1, S2 clear.  No murmurs auscultated.

ABDOMEN:  Obese, soft, nontender, nondistended.  No palpable organomegaly is

noted.  No rebound or guarding demonstrated.

BACK:  Shows spine grossly in the midline.  Normal appearing cervical lordotic

curvature, slight exaggeration of thoracic kyphosis, minor flattening of lumbar

lordotic curvature.  Cervical paraspinous muscle shows symmetrical on

inspection, on palpation shows some mild tenderness to moderate tenderness in

the middle and lower distribution of cervical paraspinous musculature.  Superior

aspect is only very minimally tender with palpation.  Musculature is symmetrical

on inspection, with palpation shows some moderate tenderness diffusely

throughout the upper, middle and lower distribution of paraspinous muscles,

again worse in the lower and middle distribution into the superior medial

trapezius bilaterally, right essentially equal to left.  No atrophy,

hypertrophy, no trigger points.  No radiation of pain.  The patient has good

rotational motion of cervical spine as well as lateral rotation with some

moderate tenderness bilaterally, but only past 45 degrees but rotates without

significant limitation.  The patient has good extension and flexion with some

minor pain reported at the base of the neck with extension only, but not with

forward flexion.

EXTREMITIES:  The patient's upper extremities show deep tendon reflexes at 2+ in

the biceps and triceps tendons.  Motor exam is strong with  strength rated

at 5/5 as is bicep and tricep flexion and symmetrical.  Shoulder shrug is strong

and intact without loss of strength on resistance as is abduction of the

shoulder to 90 degrees without loss of strength on resistance as well. 

Peripheral pulses are 2+ radial.  No peripheral edema is noted bilaterally.

SKIN:  Shows warm and dry, good turgor.  No edema.  No sores, rashes or bruising

throughout.



IMPRESSION:

1.  This is a 54-year-old female with approximate 9-month history of pain in the

base of the neck and shoulders causing headaches.

2.  Plain films cervical spine as noted.

3.  Hypertension.

4.  Obesity.

5.  Arthritis.



PLAN:  Options were discussed with the patient including conservative medical

managements, physical therapies and interventional techniques.  She elected to

pursue interventional techniques.  We discussed a cervical epidural steroid

injection using description as well as anatomical models to describe the

procedure.  Risks were discussed including but not limited to bleeding,

infection, possibility of epidural hematoma, subsequent neurological compromise,

dural puncture, headaches, spinal cord and/or nerve damage, side effects of

steroid medication including immunosuppression as well as poor results regarding

pain control.  The patient understands and wished to proceed.  The patient will

return to clinic in approximately 2 weeks for followup.  She was counseled on

return appointment, activity level and side effects to be aware of.



DIAGNOSES:  Cervical radiculopathy with cervical degenerative disk disease.



PROCEDURE:  Cervical epidural steroid injection, translaminar approach at the

C6-C7 level using C-arm fluoroscopic guidance under sterile prep and drape using

local anesthetic.



MEDICATION INJECTED:  A total of 120 mg Depo-Medrol plus 5 mL of

preservative-free normal saline and 2 mL of contrast.



CONDITION AT DISCHARGE:  Stable.  The patient tolerated procedure well, had no

complications.

 



______________________________

EVA BARTH MD



DR:  FIDENCIO/chuyita  JOB#:  [id number] / [id number]

DD:  04/06/2020 10:46  DT:  04/06/2020 11:35



CATRACHO Pickard DO

## 2020-04-20 ENCOUNTER — HOSPITAL ENCOUNTER (OUTPATIENT)
Dept: HOSPITAL 61 - PNCL | Age: 55
End: 2020-04-20
Attending: ANESTHESIOLOGY
Payer: COMMERCIAL

## 2020-04-20 DIAGNOSIS — M50.123: Primary | ICD-10-CM

## 2020-04-20 PROCEDURE — 62321 NJX INTERLAMINAR CRV/THRC: CPT

## 2020-04-20 NOTE — PAIN
DATE OF SERVICE:  04/20/2020



PROGRESS NOTE FOR PAIN CLINIC



DIAGNOSES:  Cervical radiculopathy with cervical degenerative disk disease.



HISTORY OF PRESENT ILLNESS:  The patient is a 54-year-old female who returns for

followup status post cervical epidural steroid injection x 1.  The patient

reports about 60% improvement.  The patient reports the pain is returning now,

however, over the past few days in the base of neck and shoulders, also some

posterior headaches.  The patient reports the pain is a 10 on a scale of 10 at

its worst over the past week, 8 on average, 5 at its least and is a 5 today. 

The patient reports it is throbbing pain, also sharp in the shoulders and arms,

becoming more constant and severe, it can be unbearable at times with increased

activity, reaching over her head with her arms or doing repetitive motions.  The

patient reports it is essentially equal right and left at this time into the

shoulders and arms, but has been better with sleeping.  She has not been

awakening from sleep at night.  Reports initially she was doing much better with

doing activities at home around the house, traveling with greater ease and

comfort, sleeping better, but now, the pain begins to return, again over the

past few days with a throbbing quality in the base of neck and shoulders.  The

patient reports no new motor or sensory deficits, no new bowel or bladder

incontinence or other complaints.



PHYSICAL EXAMINATION:

VITAL SIGNS:  The patient's blood pressure 154/76, pulse 100, respirations 18,

temperature 97.8 degrees Fahrenheit, height is 5 feet 3 inches, weight is 184

pounds.

GENERAL:  The patient is awake, alert, oriented, appropriate, very pleasant

demeanor.

HEENT:  Exam shows normocephalic, atraumatic.  Extraocular movements are intact

and symmetrical.  Oral cavity shows mucous membranes moist and pink.

NECK:  Shows anterior throat supple without palpable lymphadenopathy noted. 

Swallow reflex symmetrical.

CHEST:  Shows normal on inspection.  Breath sounds are clear bilaterally.

HEART:  Shows S1, S2 clear.  No murmurs auscultated.

ABDOMEN:  Soft, obese, nontender, nondistended.

BACK:  Shows spine grossly in the midline.  Normal appearing thoracic kyphosis,

cervical lordotic curvature and lumbar lordotic curvature.  Cervical paraspinous

muscle shows symmetrical on inspection, on palpation shows some moderate

tenderness diffusely only in the middle and lower distribution of paraspinous

muscles into the superior and lateral aspect of the trapezius musculature

bilaterally, but without significant trigger points, without atrophy,

hypertrophy, without radiation of pain.  The patient has good rotational motion

of cervical spine with some minor tenderness with right and left lateral

rotation, but past 45 degrees closer to 90 degrees without significant

difficulty, minor tenderness with forward flexion as well as extension, but only

very minor again without radiation.

EXTREMITIES:  Upper extremities show deep tendon reflexes 2+ in the biceps,

triceps tendons.  Motor exam is strong with 5/5  strength, bicep and tricep

flexion and equal.  Peripheral pulses are 2+ radial.  No peripheral edema is

noted bilaterally.



Options were discussed with the patient.  The patient's old chart was reviewed

as her current medication regimen updated.  Current review of systems updated

today as well.  We will proceed with a second in series of cervical epidural

steroid injection today with fluoroscopic guidance.  Risks were again discussed

including, but not limited to bleeding, infection, possibility of epidural

hematoma, subsequent neurological compromise, dural puncture, headaches, spinal

cord and/or nerve damage, side effects of steroid medication and poor results

regarding pain control.  The patient understands and wished to proceed.  The

patient will return to clinic in approximately 2 weeks for followup.  She was

counseled as to return appointment, activity level and side effects to be aware

of.



DIAGNOSES:  Cervical radiculopathy with cervical degenerative disk disease.



PROCEDURE:  Cervical epidural steroid injection, translaminar approach at C6-C7

level using C-arm fluoroscopic guidance under sterile prep and drape using local

anesthetic.



MEDICATION INJECTED:  Total of 120 mg Depo-Medrol plus 5 mL of preservative-free

normal saline and 2 mL of contrast.



CONDITION AT DISCHARGE:  Stable.  The patient tolerated the procedure well, had

no complications.

 



______________________________

EVA BARTH MD



DR:  FIDENCIO/chuyita  JOB#:  [id number] / [id number]

DD:  04/20/2020 09:12  DT:  04/20/2020 09:31

## 2020-05-04 ENCOUNTER — HOSPITAL ENCOUNTER (OUTPATIENT)
Dept: HOSPITAL 61 - PNCL | Age: 55
End: 2020-05-04
Attending: ANESTHESIOLOGY
Payer: COMMERCIAL

## 2020-05-04 DIAGNOSIS — M50.123: Primary | ICD-10-CM

## 2020-05-04 PROCEDURE — 62321 NJX INTERLAMINAR CRV/THRC: CPT

## 2020-05-04 NOTE — PAIN
DATE OF SERVICE:  05/04/2020



PROGRESS NOTE FOR PAIN CLINIC



DIAGNOSES:  Cervical radiculopathy with cervical degenerative disk disease.



HISTORY OF PRESENT ILLNESS:  The patient is a 54-year-old female who returns for

followup status post cervical epidural steroid injections x 2.  The patient

reports very good about 70% improvement overall, but still has some pain in the

base of the neck on the left side greater than the right, but The patient

reports doing much better, increased her activity with greater ease and comfort,

sleeping better at night.  She is using a supportive traction device at home as

well as new pillow, which seems to be helping.  The patient reports it is 7 on a

scale of 10 at its worst over the past week, 4 on average and 4 at its least and

is a 4 today.  The patient reports it is aching, sharp and dull, alternating in

the base of the neck, more on the left than the right, but present bilaterally. 

The patient reports no new changes, no new deficits.



PHYSICAL EXAMINATION:

VITAL SIGNS:  The patient's blood pressure 148/75, pulse 91, respirations 18,

temperature 98.5 degrees Fahrenheit, height is 5 feet 3 inches, and weight is

191 pounds.

GENERAL:  The patient is awake, alert, oriented, appropriate, very pleasant

demeanor.

HEENT:  Shows normocephalic, atraumatic.  Extraocular movements are intact and

symmetrical.  Oral cavity:  Mucous membranes moist and pink.  Dentition is

intact.

NECK:  Shows anterior throat supple without palpable lymphadenopathy noted. 

Swallow reflex symmetrical.

CHEST:  Shows normal on inspection.  Breath sounds are clear bilaterally.

HEART:  Shows S1, S2 clear.  No murmurs auscultated.

ABDOMEN:  Soft, nontender, and nondistended.  No palpable organomegaly is noted.

 No rebound or guarding demonstrated.

BACK:  Shows spine grossly in the midline.  Normal appearing thoracic kyphosis

and lumbar lordotic curvature.  Lumbar paraspinous muscle shows symmetrical on

inspection and on palpation shows some moderate tenderness diffusely without

atrophy or hypertrophy, no asymmetry.  The patient has good rotational motion of

the cervical spine both laterally as well as extension and flexion.  Cervical

paraspinous muscle shows moderate tenderness on the left greater than the right

inferior aspect of the trapezius as well as the low aspect and middle aspect of

the cervical paraspinous musculature, greater on the left than right, but

symmetrical as noted.  The patient shows upper extremity deep tendon reflexes at

2+ in the biceps and triceps tendons.  Motor exam is 5/5 with  strength,

bicep and tricep flexion equal.  Peripheral pulses are 2+ radial.  No peripheral

edema is noted bilaterally.



Options were discussed with the patient.  The patient's old chart was reviewed

as her current medication regimen updated.  Current review of systems updated

today as well.  We will proceed with third in the series of cervical epidural

steroid injection today with fluoroscopic guidance.  Risks were again discussed

including, but not limited to bleeding, infection, possibility of epidural

hematoma, subsequent neurological compromise, dural puncture, headaches, spinal

cord and/or nerve damage, side effects of steroid medication and poor results

regarding pain control.  The patient understands and wished to proceed.  The

patient will return to clinic in approximately 2 weeks for followup.  She was

counseled as to return appointment, activity level and side effects to be aware

of.



DIAGNOSES:  Cervical radiculopathy with cervical degenerative disk disease.



PROCEDURE:  Cervical epidural steroid injection, translaminar approach, C6-C7

level using C-arm fluoroscopic guidance under sterile prep and drape using local

anesthetic.



MEDICATION INJECTED:  A total of 120 mg of Depo-Medrol plus 5 mL of

preservative-free normal saline and 2 mL of contrast.



CONDITION AT DISCHARGE:  Stable.  The patient tolerated the procedure well, had

no complications.

 



______________________________

EVA BARTH MD



DR:  [email or web address]  JOB#:  [id number] / [id number]

DD:  05/04/2020 10:22  DT:  05/04/2020 10:33